# Patient Record
Sex: MALE
[De-identification: names, ages, dates, MRNs, and addresses within clinical notes are randomized per-mention and may not be internally consistent; named-entity substitution may affect disease eponyms.]

---

## 2019-11-06 PROBLEM — Z00.00 ENCOUNTER FOR PREVENTIVE HEALTH EXAMINATION: Status: ACTIVE | Noted: 2019-11-06

## 2019-11-11 ENCOUNTER — APPOINTMENT (OUTPATIENT)
Dept: COLORECTAL SURGERY | Facility: CLINIC | Age: 29
End: 2019-11-11
Payer: COMMERCIAL

## 2019-11-11 VITALS
DIASTOLIC BLOOD PRESSURE: 72 MMHG | OXYGEN SATURATION: 98 % | TEMPERATURE: 98.6 F | SYSTOLIC BLOOD PRESSURE: 128 MMHG | WEIGHT: 170 LBS | BODY MASS INDEX: 23.03 KG/M2 | HEIGHT: 72 IN | HEART RATE: 76 BPM

## 2019-11-11 DIAGNOSIS — K62.89 OTHER SPECIFIED DISEASES OF ANUS AND RECTUM: ICD-10-CM

## 2019-11-11 DIAGNOSIS — Z78.9 OTHER SPECIFIED HEALTH STATUS: ICD-10-CM

## 2019-11-11 DIAGNOSIS — Z87.898 PERSONAL HISTORY OF OTHER SPECIFIED CONDITIONS: ICD-10-CM

## 2019-11-11 DIAGNOSIS — Z83.3 FAMILY HISTORY OF DIABETES MELLITUS: ICD-10-CM

## 2019-11-11 DIAGNOSIS — L29.0 PRURITUS ANI: ICD-10-CM

## 2019-11-11 DIAGNOSIS — K62.5 HEMORRHAGE OF ANUS AND RECTUM: ICD-10-CM

## 2019-11-11 PROCEDURE — 46600 DIAGNOSTIC ANOSCOPY SPX: CPT

## 2019-11-11 PROCEDURE — 99202 OFFICE O/P NEW SF 15 MIN: CPT | Mod: 25

## 2019-11-11 RX ORDER — CLOTRIMAZOLE AND BETAMETHASONE DIPROPIONATE 10; .5 MG/G; MG/G
1-0.05 CREAM TOPICAL TWICE DAILY
Qty: 1 | Refills: 3 | Status: ACTIVE | COMMUNITY
Start: 2019-11-11 | End: 1900-01-01

## 2019-11-11 RX ORDER — HYDROCORTISONE 25 MG/G
2.5 CREAM TOPICAL TWICE DAILY
Qty: 1 | Refills: 3 | Status: ACTIVE | COMMUNITY
Start: 2019-11-11 | End: 1900-01-01

## 2019-11-11 NOTE — ASSESSMENT
[FreeTextEntry1] : Exam findings discussed at length with patient. Recommend avoid scratching, avoid overcleaning, and decreasing caffeine intake.\par Discussed perianal hygiene and topical barrier agents.\par Recommend a trial of topical therapy, including hydrocortisone and lotrisone \par Patient seeing dermatology in 2 days for evaluation of same symptoms, f/u recommendations \par \par All questions answered, patient expressed understanding and is agreeable to this plan.\par

## 2019-11-11 NOTE — HISTORY OF PRESENT ILLNESS
[FreeTextEntry1] : 30yo male present for initial evaluation\par Reports long history of perianal pain with wiping after bowel movements and BRB on toilet tissue, episodes of itchiness and discomfort\par Since 2015, at that time saw GI who did colonoscopy that was without any findings per patinet\par In 2017 patient saw colorectal surgeon who prescribed steroid cream. Patient noted improvement for 2-3 months then symptoms returned\par Has not seen anyone since then for these symptoms. Not using OTC medications or Rx medications at this time\par Moves bowels 2-3 times per day, denies constipation or diarrhea\par Denies sitz baths\par Reports 2 cups of coffee per day\par Patient is scheduled to see dermatology in 2 days for same complaints\par \par

## 2019-11-11 NOTE — PHYSICAL EXAM
[FreeTextEntry1] :  Medical assistant present for duration of physical examination\par \par Gen NAD, AOx3\par \par Anorectal Exam:\par Inspection Local perianal dermatitis with white plaques, chronic edema, and superficial fissures concerning for pruritus ani\par LAUREN nontender, no masses palpated, no blood on gloved finger\par Anoscopy no fissure, nonfriable internal hemorrhoids\par \par \par \par \par